# Patient Record
Sex: FEMALE | Race: WHITE
[De-identification: names, ages, dates, MRNs, and addresses within clinical notes are randomized per-mention and may not be internally consistent; named-entity substitution may affect disease eponyms.]

---

## 2017-01-06 ENCOUNTER — HOSPITAL ENCOUNTER (OUTPATIENT)
Dept: HOSPITAL 45 - X.SURG | Age: 37
Discharge: HOME | End: 2017-01-06
Attending: UROLOGY
Payer: COMMERCIAL

## 2017-01-06 VITALS — SYSTOLIC BLOOD PRESSURE: 147 MMHG | DIASTOLIC BLOOD PRESSURE: 74 MMHG | OXYGEN SATURATION: 97 % | HEART RATE: 89 BPM

## 2017-01-06 VITALS
BODY MASS INDEX: 49.01 KG/M2 | HEIGHT: 64.49 IN | BODY MASS INDEX: 49.01 KG/M2 | HEIGHT: 64.49 IN | WEIGHT: 290.61 LBS | WEIGHT: 290.61 LBS

## 2017-01-06 VITALS — TEMPERATURE: 98.78 F

## 2017-01-06 DIAGNOSIS — Z83.3: ICD-10-CM

## 2017-01-06 DIAGNOSIS — Z80.0: ICD-10-CM

## 2017-01-06 DIAGNOSIS — N20.1: Primary | ICD-10-CM

## 2017-01-06 DIAGNOSIS — E66.01: ICD-10-CM

## 2017-01-06 DIAGNOSIS — N39.0: ICD-10-CM

## 2017-01-06 DIAGNOSIS — E28.2: ICD-10-CM

## 2017-01-06 DIAGNOSIS — E03.9: ICD-10-CM

## 2017-01-06 DIAGNOSIS — E21.0: ICD-10-CM

## 2017-01-06 NOTE — DISCHARGE INSTRUCTIONS-SURGCTR
Discharge Instructions


Visit


Reason for Visit:  Stones





Discharge


Discharge Diagnosis / Problem:  same





Discharge Goals


Goal(s):  Decrease discomfort, Increase independence, Improve disease control





Medications


Stopped Medications Name(s):  


Fish Oil





Activity Recommendations


Activity Limitations:  as noted below (no driving on narcotics)





Anesthesia


.





Post Anesthesia Instructions:





If you have had General Anesthesia or IV Sedation:





*  Do not drive today.


*  Resume driving when surgeon permits.


*  Do not make important decisions or sign legal documents today.


*  Call surgeon for:





   1.  Temperature elevations greater than 101 degrees F.


   2.  Uncontrollable pain.


   3.  Excessive bleeding.


   4.  Persistent nausea and vomiting.


   5.  Medication intolerance (nausea, vomiting or rash).





*  For nausea and vomiting use only clear liquids such as: tea, soda, bouillon 

until nausea subsides, then gradually increase diet as tolerated.





*  If you have any concerns or questions, call your surgeon's office.  If 

physician is unavailable and it is an emergency, call 911 or go to the nearest 

emergency room.





.





Diet Recommendations


Home Diet:  resume previous diet





Procedures


Procedures Performed:  


Right Extracorporeal Shock Wave Lithotripsy--ureteral





Medical Emergencies








.


Who to Call and When:





Medical Emergencies:  If at any time you feel your situation is an emergency, 

please call 911 immediately.





.





Non-Emergent Contact





.


.





"Provider Documentation" section prepared by Anam Singer.

## 2017-01-06 NOTE — ANESTHESIA PROGRESS NT - [facility]
Anesthesia Post Op Note


Date & Time


Jan 6, 2017 at 14:34





Vital Signs


Pain Intensity:  0





 Vital Signs Past 12 Hours








  Date Time  Temp Pulse Resp B/P Pulse Ox O2 Delivery O2 Flow Rate FiO2


 


1/6/17 14:26 36.7 87 12 165/89 100 Diffusion Mask 6 


 


1/6/17 12:12 36.8 87 16 132/90 97 Room Air  











Notes


Mental Status:  alert / awake / arousable, participated in evaluation


Pt Amnestic to Procedure:  Yes


Nausea / Vomiting:  adequately controlled


Pain:  adequately controlled


Airway Patency, RR, SpO2:  stable & adequate


BP & HR:  stable & adequate


Hydration State:  stable & adequate


Anesthetic Complications:  no major complications apparent

## 2017-01-06 NOTE — OPERATIVE REPORT
DATE OF OPERATION:  01/06/2017

 

PROCEDURE PERFORMED:  Right ureteral ESWL.

 

SURGEON:  Dr. Singer.

 

ANESTHESIA:  General.

 

INDICATIONS:  The patient is a 36-year-old female who apparently had

urosepsis, had a stent placed, had her lithotripsy delayed last week because

of illness and presents now for ESWL of an 8 x 5 mm right distal ureteral

stone.

 

DESCRIPTION OF THE PROCEDURE:  The patient was given antibiotics

preoperatively, had Venodyne stockings placed, was taken to the operating

room and given general anesthesia.  She was given 3000 shocks, half of them

at level 6 after the stone was visualized in 2 views with her in the supine

position.  At the end of the procedure, the stone was still visualized.  It

is unclear how well it fragmented and she was transferred to the recovery

room in stable condition.

 

 

I attest to the content of the Intraoperative Record and any orders documented therein. Any exceptio
ns are noted below.

## 2017-01-06 NOTE — DIAGNOSTIC IMAGING REPORT
KUB



CLINICAL HISTORY: N20.1 Ureteral [id number] PREOPERATIVE STUDY



COMPARISON STUDY:  12/20/2016 



FINDINGS: There is a double-pigtail right-sided nephroureteral stent. There is

an 8 mm calculus projected over the distal stent consistent with a distal right

ureteral calculus. There is an 8 mm upper pole left renal calculus. There is a

second 4 mm upper pole left renal calculus. The spleen appears mildly enlarged.



IMPRESSION:  

1. Left-sided nephrolithiasis

2. 8 mm distal right ureteral calculus

3. Indwelling right-sided nephroureteral stent

4. Splenomegaly 







Electronically signed by:  Michael Gibbs M.D.

1/6/2017 10:53 AM



Dictated Date/Time:  1/6/2017 10:52 AM

## 2017-01-20 ENCOUNTER — HOSPITAL ENCOUNTER (OUTPATIENT)
Dept: HOSPITAL 45 - C.LAB1850 | Age: 37
Discharge: HOME | End: 2017-01-20
Attending: UROLOGY
Payer: COMMERCIAL

## 2017-01-20 DIAGNOSIS — N20.0: Primary | ICD-10-CM

## 2017-01-20 DIAGNOSIS — N20.1: ICD-10-CM

## 2017-01-20 LAB
ANION GAP SERPL CALC-SCNC: 10 MMOL/L (ref 3–11)
BASOPHILS # BLD: 0.03 K/UL (ref 0–0.2)
BASOPHILS NFR BLD: 0.5 %
BUN SERPL-MCNC: 16 MG/DL (ref 7–18)
BUN/CREAT SERPL: 21.6 (ref 10–20)
CALCIUM SERPL-MCNC: 8.6 MG/DL (ref 8.5–10.1)
CHLORIDE SERPL-SCNC: 102 MMOL/L (ref 98–107)
CO2 SERPL-SCNC: 27 MMOL/L (ref 21–32)
COMPLETE: YES
CREAT SERPL-MCNC: 0.72 MG/DL (ref 0.6–1.2)
EOSINOPHIL NFR BLD AUTO: 191 K/UL (ref 130–400)
GLUCOSE SERPL-MCNC: 142 MG/DL (ref 70–99)
HCT VFR BLD CALC: 35.5 % (ref 37–47)
IG%: 0.2 %
IMM GRANULOCYTES NFR BLD AUTO: 29.5 %
LYMPHOCYTES # BLD: 1.84 K/UL (ref 1.2–3.4)
MCH RBC QN AUTO: 29.2 PG (ref 25–34)
MCHC RBC AUTO-ENTMCNC: 33.8 G/DL (ref 32–36)
MCV RBC AUTO: 86.4 FL (ref 80–100)
MONOCYTES NFR BLD: 8.5 %
NEUTROPHILS # BLD AUTO: 2.2 %
NEUTROPHILS NFR BLD AUTO: 59.1 %
PMV BLD AUTO: 9.8 FL (ref 7.4–10.4)
POTASSIUM SERPL-SCNC: 3.7 MMOL/L (ref 3.5–5.1)
RBC # BLD AUTO: 4.11 M/UL (ref 4.2–5.4)
SODIUM SERPL-SCNC: 139 MMOL/L (ref 136–145)
WBC # BLD AUTO: 6.24 K/UL (ref 4.8–10.8)

## 2017-01-26 ENCOUNTER — HOSPITAL ENCOUNTER (OUTPATIENT)
Dept: HOSPITAL 45 - C.RAD1850 | Age: 37
Discharge: HOME | End: 2017-01-26
Attending: UROLOGY
Payer: COMMERCIAL

## 2017-01-26 DIAGNOSIS — N20.0: Primary | ICD-10-CM

## 2017-01-26 NOTE — DIAGNOSTIC IMAGING REPORT
KUB



CLINICAL HISTORY: Kidney stones. Ureteral stone.    



COMPARISON STUDY:  CT of the abdomen and pelvis December 12, 2016 and KUB

January 16, 2017. 



FINDINGS: Several left renal calculi measure up to 8 mm. The right ureteral

stent has been removed since exam of January 16, 2017. The distal right ureteral

calculi/fragments shown on exam of January 16, 2017 are no longer visualized.

Faint pelvic calcifications likely reflect phleboliths. 





IMPRESSION:  



1. Left renal calculi measuring up to 8 mm.



2. Interval removal of the right ureteral stent. The distal right ureteral

calculi/fragments shown on prior exam are no longer visualized.







Electronically signed by:  Js Sutton M.D.

1/26/2017 2:23 PM



Dictated Date/Time:  1/26/2017 2:20 PM

## 2017-01-27 ENCOUNTER — HOSPITAL ENCOUNTER (OUTPATIENT)
Dept: HOSPITAL 45 - X.SURG | Age: 37
Discharge: HOME | End: 2017-01-27
Attending: UROLOGY
Payer: COMMERCIAL

## 2017-01-27 VITALS
WEIGHT: 290.61 LBS | BODY MASS INDEX: 49.01 KG/M2 | BODY MASS INDEX: 49.01 KG/M2 | WEIGHT: 290.61 LBS | HEIGHT: 64.49 IN | HEIGHT: 64.49 IN

## 2017-01-27 VITALS — HEART RATE: 87 BPM | DIASTOLIC BLOOD PRESSURE: 73 MMHG | OXYGEN SATURATION: 96 % | SYSTOLIC BLOOD PRESSURE: 121 MMHG

## 2017-01-27 VITALS — TEMPERATURE: 97.52 F

## 2017-01-27 DIAGNOSIS — E28.2: ICD-10-CM

## 2017-01-27 DIAGNOSIS — E21.0: ICD-10-CM

## 2017-01-27 DIAGNOSIS — Z98.890: ICD-10-CM

## 2017-01-27 DIAGNOSIS — N39.0: ICD-10-CM

## 2017-01-27 DIAGNOSIS — Z98.51: ICD-10-CM

## 2017-01-27 DIAGNOSIS — Z95.5: ICD-10-CM

## 2017-01-27 DIAGNOSIS — N20.0: Primary | ICD-10-CM

## 2017-01-27 DIAGNOSIS — E66.01: ICD-10-CM

## 2017-01-27 RX ADMIN — SODIUM CHLORIDE, SODIUM LACTATE, POTASSIUM CHLORIDE, AND CALCIUM CHLORIDE SCH MLS/HR: 600; 310; 30; 20 INJECTION, SOLUTION INTRAVENOUS at 09:09

## 2017-01-27 RX ADMIN — SODIUM CHLORIDE, SODIUM LACTATE, POTASSIUM CHLORIDE, AND CALCIUM CHLORIDE SCH MLS/HR: 600; 310; 30; 20 INJECTION, SOLUTION INTRAVENOUS at 12:36

## 2017-01-27 NOTE — OPERATIVE REPORT
DATE OF OPERATION:  01/27/2017

 

PREOPERATIVE DIAGNOSIS:  Left renal calculus.

 

POSTOPERATIVE DIAGNOSIS:  Left renal calculus.

 

PROCEDURE PERFORMED:  Left extracorporeal shockwave lithotripsy.

 

SURGEON:  Dr. Yimi Yi. 

 

ANESTHESIA:  General.

 

ESTIMATED BLOOD LOSS:  0.

 

URINE OUTPUT:  Not recorded.

 

SPECIMENS:  None.  

 

DRAINS:  None.  

 

DESCRIPTION OF THE PROCEDURE:  Kate Cyr was identified in the

preoperative holding area.  Appropriate informed consents were reviewed and

completed and the patient was transported to the operating suite.  Upon

arrival, she received appropriate preoperative antibiotics in the form of

ciprofloxacin.  Adequate general anesthesia was achieved.  The stone in the

left upper pole was localized under fluoroscopy and treated with a total of

2500 shocks.  Further details can be found on the [employer]

Management Information Sheet.  At the conclusion of the case, the patient was

extubated and taken to the PACU in stable condition.

 

 

I attest to the content of the Intraoperative Record and any orders documented therein. Any exceptio
ns are noted below.

## 2017-01-27 NOTE — ANESTHESIA PROGRESS NT - [facility]
Anesthesia Post Op Note


Date & Time


Jan 27, 2017 at 13:12





Vital Signs


Pain Intensity:  0





 Vital Signs Past 12 Hours








  Date Time  Temp Pulse Resp B/P Pulse Ox O2 Delivery O2 Flow Rate FiO2


 


1/27/17 12:53  89 13 137/65 97   


 


1/27/17 12:53 36.4 89 13     


 


1/27/17 12:48  91 8     


 


1/27/17 12:48  91 8 153/87 91   


 


1/27/17 12:43  91 17     


 


1/27/17 12:43  90 17 153/69 100   


 


1/27/17 12:38  87 14     


 


1/27/17 12:38  87 14 149/90 100   


 


1/27/17 12:33  86 9     


 


1/27/17 12:33  86 9 129/85 100   


 


1/27/17 12:28  81 9 134/87 100   


 


1/27/17 12:28  82 9     


 


1/27/17 12:24 36.6 85 12 150/85 100 Mask 6 


 


1/27/17 12:23  84  136/81 100   


 


1/27/17 12:23  84      


 


1/27/17 08:57 37.1 99 18 137/83 97 Room Air  











Notes


Mental Status:  alert / awake / arousable, participated in evaluation


Pt Amnestic to Procedure:  Yes


Nausea / Vomiting:  adequately controlled


Pain:  adequately controlled


Airway Patency, RR, SpO2:  stable & adequate


BP & HR:  stable & adequate


Hydration State:  stable & adequate


Anesthetic Complications:  no major complications apparent

## 2017-01-27 NOTE — DISCHARGE INSTRUCTIONS-SURGCTR
Discharge Instructions


Visit


Reason for Visit:  Stones





Discharge


Discharge Diagnosis / Problem:  stones





Discharge Goals


Goal(s):  Decrease discomfort, Improve function, Increase independence, Improve 

disease control





Medications


Stopped Medications Name(s):  


fish oil and metformin and ibuprofen stopped a week ago.





Activity Recommendations


Activity Limitations:  resume your previous activity


Lifting Limitations:  none


Exercise/Sports Limitations:  none


May Resume Sexual Activity:  when tolerated


Shower/Bathe:  no limitations


Driving or Machine Use:  no limitations (as long as you are off of pain meds)





Anesthesia


.





Post Anesthesia Instructions:





If you have had General Anesthesia or IV Sedation:





*  Do not drive today.


*  Resume driving when surgeon permits.


*  Do not make important decisions or sign legal documents today.


*  Call surgeon for:





   1.  Temperature elevations greater than 101 degrees F.


   2.  Uncontrollable pain.


   3.  Excessive bleeding.


   4.  Persistent nausea and vomiting.


   5.  Medication intolerance (nausea, vomiting or rash).





*  For nausea and vomiting use only clear liquids such as: tea, soda, bouillon 

until nausea subsides, then gradually increase diet as tolerated.





*  If you have any concerns or questions, call your surgeon's office.  If 

physician is unavailable and it is an emergency, call 911 or go to the nearest 

emergency room.





.





Diet Recommendations


Home Diet:  no limitations





Pending Studies


Studies pending at discharge:  no





Medical Emergencies








.


Who to Call and When:





Medical Emergencies:  If at any time you feel your situation is an emergency, 

please call 911 immediately.





.





Non-Emergent Contact


Non-Emergency issues call your:  Urologist


Call Non-Emergent contact if:  you have a fever, temperature is above 101.5, 

your pain is not controlled, your pain is worsening





.


.





"Provider Documentation" section prepared by Daniel Hobbs.

## 2017-02-15 ENCOUNTER — HOSPITAL ENCOUNTER (OUTPATIENT)
Dept: HOSPITAL 45 - C.RAD1850 | Age: 37
Discharge: HOME | End: 2017-02-15
Attending: UROLOGY
Payer: COMMERCIAL

## 2017-02-15 DIAGNOSIS — N20.0: Primary | ICD-10-CM

## 2017-02-15 DIAGNOSIS — N20.1: ICD-10-CM

## 2017-02-15 NOTE — DIAGNOSTIC IMAGING REPORT
KUB



HISTORY:      N20.0 Kidney [id number] Ureteral [id number]



COMPARISON: KUB 1/26/2017.



FINDINGS: The bowel gas pattern is unremarkable. There are no dilated loops of

small bowel to suggest an obstruction.  Multiple left renal calculi are again

noted. Dominant 8 mm stone within the upper pole appears to be fragmented. The

remaining renal calculi remain unchanged.. No definite right renal calculi. No

ureteral calculi. No pneumoperitoneum or pneumatosis.



IMPRESSION:  

Left-sided nephrolithiasis. The dominant stone within the upper pole of the left

kidney has been fragmented in the interval.







Electronically signed by:  Nader Melendez M.D.

2/15/2017 11:54 AM



Dictated Date/Time:  2/15/2017 11:51 AM

## 2017-02-23 ENCOUNTER — HOSPITAL ENCOUNTER (OUTPATIENT)
Dept: HOSPITAL 45 - C.LAB1850 | Age: 37
Discharge: HOME | End: 2017-02-23
Attending: NURSE PRACTITIONER
Payer: COMMERCIAL

## 2017-02-23 DIAGNOSIS — N20.1: ICD-10-CM

## 2017-02-23 DIAGNOSIS — N39.0: Primary | ICD-10-CM

## 2017-03-13 ENCOUNTER — HOSPITAL ENCOUNTER (OUTPATIENT)
Dept: HOSPITAL 45 - C.RAD1850 | Age: 37
Discharge: HOME | End: 2017-03-13
Attending: NURSE PRACTITIONER
Payer: COMMERCIAL

## 2017-03-13 DIAGNOSIS — N20.0: Primary | ICD-10-CM

## 2017-03-13 NOTE — DIAGNOSTIC IMAGING REPORT
KUB



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



COMPARISON STUDY:  2/15/2017 



FINDINGS: Unchanging fragmenting calcification upper aspect left kidney. Small

calcification lower aspect left kidney. Right kidney shows no definite

calcifications. No definite paravertebral calcifications. Pelvic structures are

unremarkable.



IMPRESSION:  Slight decrease in number of several fragmented calcifications

lower aspect left kidney. Unchanging fragmented calcification upper aspect left

kidney. 







Electronically signed by:  Brandyn Ariza M.D.

3/13/2017 3:38 PM



Dictated Date/Time:  3/13/2017 3:37 PM

## 2017-03-24 ENCOUNTER — HOSPITAL ENCOUNTER (OUTPATIENT)
Dept: HOSPITAL 45 - C.LAB1850 | Age: 37
Discharge: HOME | End: 2017-03-24
Attending: NURSE PRACTITIONER
Payer: COMMERCIAL

## 2017-03-24 DIAGNOSIS — R30.0: ICD-10-CM

## 2017-03-24 DIAGNOSIS — N39.0: Primary | ICD-10-CM

## 2017-04-27 ENCOUNTER — HOSPITAL ENCOUNTER (OUTPATIENT)
Dept: HOSPITAL 45 - C.RAD1850 | Age: 37
Discharge: HOME | End: 2017-04-27
Attending: NURSE PRACTITIONER
Payer: COMMERCIAL

## 2017-04-27 DIAGNOSIS — N39.0: ICD-10-CM

## 2017-04-27 DIAGNOSIS — N20.0: Primary | ICD-10-CM

## 2017-04-27 NOTE — DIAGNOSTIC IMAGING REPORT
KUB



CLINICAL HISTORY: Kidney stones. Recurrent urinary tract infection    



COMPARISON STUDY:  KUB March 13, 2017. 



FINDINGS: The bowel gas pattern is normal. Several left renal calculi are

similar to exam of March 13, 2017. No ureteral calculi are identified.



IMPRESSION:  No significant change in left-sided nephrolithiasis. 







Electronically signed by:  Js Sutton M.D.

4/27/2017 1:20 PM



Dictated Date/Time:  4/27/2017 1:18 PM

## 2017-05-11 ENCOUNTER — HOSPITAL ENCOUNTER (OUTPATIENT)
Dept: HOSPITAL 45 - C.LAB1850 | Age: 37
Discharge: HOME | End: 2017-05-11
Attending: NURSE PRACTITIONER
Payer: COMMERCIAL

## 2017-05-11 DIAGNOSIS — N20.1: Primary | ICD-10-CM

## 2017-05-11 LAB
ANION GAP SERPL CALC-SCNC: 6 MMOL/L (ref 3–11)
BASOPHILS # BLD: 0.03 K/UL (ref 0–0.2)
BASOPHILS NFR BLD: 0.4 %
BUN SERPL-MCNC: 13 MG/DL (ref 7–18)
BUN/CREAT SERPL: 16.4 (ref 10–20)
CHLORIDE SERPL-SCNC: 101 MMOL/L (ref 98–107)
CO2 SERPL-SCNC: 30 MMOL/L (ref 21–32)
COMPLETE: YES
CREAT SERPL-MCNC: 0.8 MG/DL (ref 0.6–1.2)
EOSINOPHIL NFR BLD AUTO: 218 K/UL (ref 130–400)
HCT VFR BLD CALC: 39.2 % (ref 37–47)
IG%: 0.1 %
IMM GRANULOCYTES NFR BLD AUTO: 27.5 %
LYMPHOCYTES # BLD: 1.95 K/UL (ref 1.2–3.4)
MCH RBC QN AUTO: 28.1 PG (ref 25–34)
MCHC RBC AUTO-ENTMCNC: 32.7 G/DL (ref 32–36)
MCV RBC AUTO: 86 FL (ref 80–100)
MONOCYTES NFR BLD: 6.9 %
NEUTROPHILS # BLD AUTO: 2.8 %
NEUTROPHILS NFR BLD AUTO: 62.3 %
PMV BLD AUTO: 10.7 FL (ref 7.4–10.4)
POTASSIUM SERPL-SCNC: 4 MMOL/L (ref 3.5–5.1)
RBC # BLD AUTO: 4.56 M/UL (ref 4.2–5.4)
SODIUM SERPL-SCNC: 137 MMOL/L (ref 136–145)
WBC # BLD AUTO: 7.1 K/UL (ref 4.8–10.8)

## 2017-05-25 ENCOUNTER — HOSPITAL ENCOUNTER (OUTPATIENT)
Dept: HOSPITAL 45 - C.RAD1850 | Age: 37
Discharge: HOME | End: 2017-05-25
Attending: NURSE PRACTITIONER
Payer: COMMERCIAL

## 2017-05-25 DIAGNOSIS — N20.0: Primary | ICD-10-CM

## 2017-05-25 NOTE — DIAGNOSTIC IMAGING REPORT
KUB



CLINICAL HISTORY: Ureteral stone.



FINDINGS: 2 AP supine abdominal radiographs are compared to study dated

4/27/2017 and correlated with abdominal CT dated 12/12/2016. There is a

nonobstructed abdominal bowel gas pattern. An 8 mm calculus projects over the

upper pole of the left kidney. No additional calcifications are seen projecting

over the right kidney or along the course of the ureters. The bony structures

appear intact. The lung bases are clear as imaged.



IMPRESSION:



1. A nonobstructing left renal calculus is similar to previous.



2. There is no radiographic evidence of ureteral calculus as clinically queried.







Electronically signed by:  Solomon Cho M.D.

5/25/2017 4:13 PM



Dictated Date/Time:  5/25/2017 4:06 PM

## 2017-05-26 ENCOUNTER — HOSPITAL ENCOUNTER (OUTPATIENT)
Dept: HOSPITAL 45 - X.SURG | Age: 37
Discharge: HOME | End: 2017-05-26
Attending: UROLOGY
Payer: COMMERCIAL

## 2017-05-26 VITALS — SYSTOLIC BLOOD PRESSURE: 134 MMHG | OXYGEN SATURATION: 98 % | HEART RATE: 83 BPM | DIASTOLIC BLOOD PRESSURE: 76 MMHG

## 2017-05-26 VITALS
HEIGHT: 65 IN | WEIGHT: 290.61 LBS | WEIGHT: 290.61 LBS | BODY MASS INDEX: 48.42 KG/M2 | BODY MASS INDEX: 48.42 KG/M2 | HEIGHT: 65 IN

## 2017-05-26 VITALS — TEMPERATURE: 98.24 F

## 2017-05-26 DIAGNOSIS — Z80.0: ICD-10-CM

## 2017-05-26 DIAGNOSIS — E89.2: ICD-10-CM

## 2017-05-26 DIAGNOSIS — Z87.442: ICD-10-CM

## 2017-05-26 DIAGNOSIS — F32.9: ICD-10-CM

## 2017-05-26 DIAGNOSIS — E55.9: ICD-10-CM

## 2017-05-26 DIAGNOSIS — E66.01: ICD-10-CM

## 2017-05-26 DIAGNOSIS — Z98.51: ICD-10-CM

## 2017-05-26 DIAGNOSIS — Z87.440: ICD-10-CM

## 2017-05-26 DIAGNOSIS — N20.0: Primary | ICD-10-CM

## 2017-05-26 DIAGNOSIS — Z79.84: ICD-10-CM

## 2017-05-26 DIAGNOSIS — Z83.3: ICD-10-CM

## 2017-05-26 DIAGNOSIS — Z80.3: ICD-10-CM

## 2017-05-26 DIAGNOSIS — E21.0: ICD-10-CM

## 2017-05-26 NOTE — ANESTHESIA PROGRESS NT - [facility]
Anesthesia Post Op Note


Date & Time


May 26, 2017 at 09:19





Vital Signs


Pain Intensity:  0





 Vital Signs Past 12 Hours








  Date Time  Temp Pulse Resp B/P Pulse Ox O2 Delivery O2 Flow Rate FiO2


 


5/26/17 08:47  83 20 134/76 98 Room Air  


 


5/26/17 08:20 36.8 83 18 127/80 96 Room Air  


 


5/26/17 08:16  82 15 131/74 97   


 


5/26/17 08:16  83 15     


 


5/26/17 08:15  90 18  97   


 


5/26/17 08:15 36.8 89 18     


 


5/26/17 08:11    157/80    


 


5/26/17 08:10  91 13  98   


 


5/26/17 08:10  93 13     


 


5/26/17 08:06    127/92    


 


5/26/17 08:05  79 14  99   


 


5/26/17 08:05  80 14     


 


5/26/17 08:01    125/79    


 


5/26/17 08:00  85 14  100   


 


5/26/17 08:00  84 14     


 


5/26/17 07:56    138/73    


 


5/26/17 07:55  85 17  100   


 


5/26/17 07:55  86 17     


 


5/26/17 07:51    140/83    


 


5/26/17 07:50  78 16  100   


 


5/26/17 07:50  79 16     


 


5/26/17 07:48 36.6 82 16 132/74 99 Mask 6 


 


5/26/17 07:46    132/74    


 


5/26/17 06:26 37.1 84 18 137/82 98 Room Air  











Notes


Mental Status:  alert / awake / arousable, participated in evaluation


Pt Amnestic to Procedure:  Yes


Nausea / Vomiting:  adequately controlled


Pain:  adequately controlled


Airway Patency, RR, SpO2:  stable & adequate


BP & HR:  stable & adequate


Hydration State:  stable & adequate


Anesthetic Complications:  no major complications apparent

## 2017-05-26 NOTE — DISCHARGE INSTRUCTIONS
Discharge Instructions


Date of Service


May 26, 2017.





Admission


Reason for Admission:  Stones





Discharge


Discharge Diagnosis / Problem:  L renal stones s/p ESWL





Discharge Goals


Goal(s):  Decrease discomfort, Improve function, Improve disease control, 

Therapeutic intervention





Activity Recommendations


Activity Limitations:  per Instructions/Follow-up section


Lifting Limitations:  no more than 25 pounds, gradually increase as tolerated


Exercise/Sports Limitations:  rest today


May Resume Sexual Activity:  when tolerated


Shower/Bathe:  no limitations


Driving or Machine Use:  resume 1 day after discharge


Strain urine as instructed





KUB prior to follow-up appointment as instructed


.





Discharge Diet


Recommended Diet:  Regular Diet (good fluid intake)





Procedures


Procedures Performed:  


L renal ESWL





Pending Studies


Studies pending at discharge:  no





Medical Emergencies








.


Who to Call and When:





Medical Emergencies:  If at any time you feel your situation is an emergency, 

please call 911 immediately.





.





Non-Emergent Contact


Non-Emergency issues call your:  Urologist


Call Non-Emergent contact if:  you have a fever, temperature is above 101, your 

pain is not controlled, your pain is worsening, your pain is unusual for you, 

your pain is concerning you, you have any medication questions





.


.





"Provider Documentation" section prepared by Jalen Burgess.








.





VTE Core Measure


Inpt VTE Proph given/why not?:  SCD's





[place] Drug Monitoring Program


Search Results:  patient reviewed within database, no issues identified

## 2017-05-26 NOTE — OPERATIVE REPORT
DATE OF OPERATION:  05/26/2017

 

PREOPERATIVE DIAGNOSIS:  Left 8-mm renal stone.

 

POSTOPERATIVE DIAGNOSIS:  Same.

 

PROCEDURE:  Left-sided renal extracorporeal shockwave lithotripsy.

 

SURGEON:  Dr. Jalen Burgess.

 

ASSISTANT:  None.

 

ANESTHESIA:  General anesthesia with laryngeal mask.

 

COMPLICATIONS:  None.

 

FINDINGS:  Good stone fragmentation on fluoroscopy.

 

DRAINS: None.

 

SPECIMENS:  None.

 

BRIEF HISTORY:  Ms. Cyr is a pleasant 36-year-old  female with a

history of stone disease, who has undergone lithotripsy and passage of stone

fragments.  She has residual left 8-mm lower pole stone fragment and is here

today for therapy.  Risks and benefits were reviewed and informed consent

reviewed with the patient.  The patient reports that she has pain medication

at home.  Therefore, no additional pain medication will be provided today.

 

DETAILS OF PROCEDURE:  The patient was brought to the litho suite. She was

correctly identified and the stone was visualized on her most recent x-rays. 

After the correct time out was performed the patient was positioned over the

therapy head.  An adequate level of anesthesia was administered.  The

extracorporeal shockwave lithotripsy treatment was then commenced.  Please

see the [employer] Management sheet for complete treatment

summary.  After completion of the procedure the patient was taken to the

recovery room in stable condition.

 

 

I attest to the content of the Intraoperative Record and any orders documented therein. Any exceptio
ns are noted below.

## 2017-05-26 NOTE — [facility] POST OPERATIVE BRIEF NOTE
Immediate Operative Summary


Operative Date


May 26, 2017.





Pre-Operative Diagnosis





Left Renal Calculi





Post-Operative Diagnosis





Same





Procedure(s) Performed





L renal ESWL





Surgeon


Dr. EVELIO Burgess





Assistant Surgeon(s)


None





Estimated Blood Loss


0 mL





Findings


Good stone fragmentation





Specimens





None





Drains


NA





Anesthesia


GALMA





Complication(s)


None





Disposition


Recovery Room / PACU

## 2017-06-06 ENCOUNTER — HOSPITAL ENCOUNTER (OUTPATIENT)
Dept: HOSPITAL 45 - C.RAD | Age: 37
Discharge: HOME | End: 2017-06-06
Attending: NURSE PRACTITIONER
Payer: COMMERCIAL

## 2017-06-06 DIAGNOSIS — N20.1: Primary | ICD-10-CM

## 2017-07-03 ENCOUNTER — HOSPITAL ENCOUNTER (OUTPATIENT)
Dept: HOSPITAL 45 - C.RAD1850 | Age: 37
Discharge: HOME | End: 2017-07-03
Attending: NURSE PRACTITIONER
Payer: COMMERCIAL

## 2017-07-03 DIAGNOSIS — N20.0: Primary | ICD-10-CM

## 2017-07-03 NOTE — DIAGNOSTIC IMAGING REPORT
KUB



CLINICAL HISTORY: N20.0 Kidney [id number]    



COMPARISON STUDY:  6/6/2017 



FINDINGS: No change in the prior study. Nonobstructing lower pole and possibly

upper pole left renal calcifications. Right renal shadow appears to be

unremarkable. Bowel pattern is nonobstructive. There are no significant pelvic

calcifications.



IMPRESSION:  Nephrocalcinosis unchanged from the prior study. 







Electronically signed by:  Brandyn Ariza M.D.

7/3/2017 8:50 AM



Dictated Date/Time:  7/3/2017 8:49 AM

## 2017-07-18 ENCOUNTER — HOSPITAL ENCOUNTER (OUTPATIENT)
Dept: HOSPITAL 45 - C.LAB1850 | Age: 37
Discharge: HOME | End: 2017-07-18
Attending: UROLOGY
Payer: COMMERCIAL

## 2017-07-18 DIAGNOSIS — N20.2: Primary | ICD-10-CM

## 2017-07-18 LAB
ANION GAP SERPL CALC-SCNC: 6 MMOL/L (ref 3–11)
BASOPHILS # BLD: 0.02 K/UL (ref 0–0.2)
BASOPHILS NFR BLD: 0.3 %
BUN SERPL-MCNC: 14 MG/DL (ref 7–18)
BUN/CREAT SERPL: 18.9 (ref 10–20)
CALCIUM SERPL-MCNC: 8.8 MG/DL (ref 8.5–10.1)
CHLORIDE SERPL-SCNC: 102 MMOL/L (ref 98–107)
CO2 SERPL-SCNC: 28 MMOL/L (ref 21–32)
COMPLETE: YES
CREAT SERPL-MCNC: 0.76 MG/DL (ref 0.6–1.2)
EOSINOPHIL NFR BLD AUTO: 201 K/UL (ref 130–400)
GLUCOSE SERPL-MCNC: 218 MG/DL (ref 70–99)
HCT VFR BLD CALC: 37.9 % (ref 37–47)
IG%: 0.3 %
IMM GRANULOCYTES NFR BLD AUTO: 28.6 %
LYMPHOCYTES # BLD: 1.87 K/UL (ref 1.2–3.4)
MCH RBC QN AUTO: 27.8 PG (ref 25–34)
MCHC RBC AUTO-ENTMCNC: 32.7 G/DL (ref 32–36)
MCV RBC AUTO: 85 FL (ref 80–100)
MONOCYTES NFR BLD: 8.3 %
NEUTROPHILS # BLD AUTO: 3.4 %
NEUTROPHILS NFR BLD AUTO: 59.1 %
PMV BLD AUTO: 10.4 FL (ref 7.4–10.4)
POTASSIUM SERPL-SCNC: 4 MMOL/L (ref 3.5–5.1)
RBC # BLD AUTO: 4.46 M/UL (ref 4.2–5.4)
SODIUM SERPL-SCNC: 136 MMOL/L (ref 136–145)
WBC # BLD AUTO: 6.54 K/UL (ref 4.8–10.8)

## 2017-07-31 ENCOUNTER — HOSPITAL ENCOUNTER (OUTPATIENT)
Dept: HOSPITAL 45 - C.RAD1850 | Age: 37
Discharge: HOME | End: 2017-07-31
Attending: NURSE PRACTITIONER
Payer: COMMERCIAL

## 2017-07-31 DIAGNOSIS — N20.0: Primary | ICD-10-CM

## 2017-07-31 DIAGNOSIS — N39.0: ICD-10-CM

## 2017-07-31 NOTE — DIAGNOSTIC IMAGING REPORT
KUB



CLINICAL HISTORY: 36 years-old Female presenting with kidney stones, recurrent

UTI. 



TECHNIQUE: Single supine view of the abdomen was obtained.



COMPARISON:  7/3/2017.



FINDINGS:

Calcifications projecting over the left kidney, the largest measuring 6 mm at

the upper pole. No calcifications project over the right kidney or along the

course of the ureters. No calcification projects over the bladder.



Nonobstructive bowel gas pattern. Osseous structures normal.



IMPRESSION:

1.  Stable left renal calculi. No evidence of right renal calculi.







Electronically signed by:  Luis M Talamantes M.D.

7/31/2017 1:06 PM



Dictated Date/Time:  7/31/2017 1:03 PM

## 2017-08-01 ENCOUNTER — HOSPITAL ENCOUNTER (OUTPATIENT)
Dept: HOSPITAL 45 - C.ACU | Age: 37
Discharge: HOME | End: 2017-08-01
Attending: UROLOGY
Payer: COMMERCIAL

## 2017-08-01 VITALS
OXYGEN SATURATION: 95 % | TEMPERATURE: 98.42 F | DIASTOLIC BLOOD PRESSURE: 90 MMHG | SYSTOLIC BLOOD PRESSURE: 152 MMHG | HEART RATE: 94 BPM

## 2017-08-01 VITALS
SYSTOLIC BLOOD PRESSURE: 152 MMHG | TEMPERATURE: 98.78 F | DIASTOLIC BLOOD PRESSURE: 78 MMHG | OXYGEN SATURATION: 98 % | HEART RATE: 83 BPM

## 2017-08-01 VITALS
HEIGHT: 65 IN | WEIGHT: 290.61 LBS | BODY MASS INDEX: 48.42 KG/M2 | BODY MASS INDEX: 48.42 KG/M2 | WEIGHT: 290.61 LBS | HEIGHT: 65 IN

## 2017-08-01 VITALS
DIASTOLIC BLOOD PRESSURE: 79 MMHG | TEMPERATURE: 98.78 F | OXYGEN SATURATION: 94 % | SYSTOLIC BLOOD PRESSURE: 155 MMHG | HEART RATE: 90 BPM

## 2017-08-01 DIAGNOSIS — Z79.899: ICD-10-CM

## 2017-08-01 DIAGNOSIS — E21.0: ICD-10-CM

## 2017-08-01 DIAGNOSIS — E28.2: ICD-10-CM

## 2017-08-01 DIAGNOSIS — E03.9: ICD-10-CM

## 2017-08-01 DIAGNOSIS — E66.01: ICD-10-CM

## 2017-08-01 DIAGNOSIS — Z79.84: ICD-10-CM

## 2017-08-01 DIAGNOSIS — N20.0: Primary | ICD-10-CM

## 2017-08-01 NOTE — DIAGNOSTIC IMAGING REPORT
RETROGRADE UROGRAM 3 VIEWS



CLINICAL HISTORY: LASER, LITHOTRIPSY LEFT    



COMPARISON STUDY:  KUB dated 7/31/2017



FINDINGS: 20 seconds of fluoroscopic time was utilized. 3 intraoperative

fluoroscopic spot images are provided for interpretation. The images demonstrate

a retrograde catheter and guidewire within the left renal pelvis. There is an

equivocal filling defect within a lower pole calyx possibly representing an air

bubble. The final image demonstrates the proximal pigtail of a nephroureteral

stent



IMPRESSION:  Intraoperative fluoroscopic spot images during the placement of a

left-sided nephroureteral stent 









Electronically signed by:  Michael Gibbs M.D.

8/1/2017 1:25 PM



Dictated Date/Time:  8/1/2017 1:23 PM

## 2017-08-01 NOTE — ANESTHESIOLOGY PROGRESS NOTE
Anesthesia Post Op Note


Date & Time


Aug 1, 2017 at 13:13





Vital Signs


Pain Intensity:  4





Vital Signs Past 12 Hours








  Date Time  Temp Pulse Resp B/P (MAP) Pulse Ox O2 Delivery O2 Flow Rate FiO2


 


8/1/17 13:05  85 16 157/95 97 Oxymask 3 


 


8/1/17 12:55  92 16 158/97 97 Oxymask 3 


 


8/1/17 12:45  90 16 144/94 99 Oxymask 5 


 


8/1/17 12:38 36.6 85 12 147/85 100 Oxymask 10 


 


8/1/17 11:10 37.1 83 20 152/78 (102) 98 Room Air  











Notes


Mental Status:  alert / awake / arousable, participated in evaluation


Pt Amnestic to Procedure:  Yes


Nausea / Vomiting:  adequately controlled


Pain:  adequately controlled


Airway Patency, RR, SpO2:  stable & adequate


BP & HR:  stable & adequate


Hydration State:  stable & adequate


Anesthetic Complications:  no major complications apparent

## 2017-08-01 NOTE — [facility] OPERATIVE REPORT
Operative Report


Operative Date


Aug 1, 2017.





Pre-Operative Diagnosis





Nephrolithiasis





Post-Operative Diagnosis





Nephrolithiasis





Procedure(s) Performed





Cystoscopy, Left Ureteroscopy, Laser Lithotripsy; Stent ([id number])





Surgeon


Kassidy





Assistant Surgeon(s)


none





Estimated Blood Loss


3 mL





Findings


4-5 stones within the left kidney; no hydronephrosis





Drains


6 Chadian by 26 cm double-J ureteral stent





Anesthesia


Gen.





Complication(s)


None





Disposition


Recovery Room / PACU (stable)





Indications


Recurrent stone formation





Description of Procedure


Kate Cyr was identified in the preoperative holding area, appropriate 

informed consents reviewed and completed, and the patient was transported to 

the operating suite.  Upon arrival she received appropriate preoperative 

antibiotics in the form of ceftriaxone.  Adequate general anesthesia was 

achieved, and the patient was placed in dorsal lithotomy position where she was 

sterilely prepped and draped in standard fashion.  I began the case by passing 

a 22 Chadian cystoscope with 30 lens per urethra.  Full inspection of the 

bladder was carried out.  Ureteral orifices were in orthotopic position.  There 

were no  mucosal tumors nor bladder calculi.





Following my inspection I turned my attention to the left ureteral orifice.  

This orifice was cannulated with a sensor wire and a 10 Chadian double-lumen 

catheter.  I utilized the second port on the double-lumen catheter to pass a 

second wire to the kidney.  Under fluoroscopic guidance there were faint 

opacities within the presumed location of the kidney consistent with the stones 

seen previously on imaging.  I withdrew the 10 Chadian double-lumen catheter and 

under fluoroscopic guidance I advanced a ureteral access sheath up the ureter.  

I then passed a flexible ureteroscope and performed a full renoscopy.  In the 

upper pole I encountered 2 distinct stones, and additional stone was located in 

the midpole, and a series of stones in the lower pole.  Additionally there 

appeared to be very small Randles plaques scattered around the papillae.  I 

passed a 400  laser fiber and commenced laser lithotripsy beginning in the 

upper pole.  I sequentially moved through all of the stones, fragmenting each 

into pieces small enough for safe, spontaneous passage.  After treating all of 

the stones, I repeated a full renoscopy confirming no large retained fragments.

  I then performed a very careful exit ureteroscopy simultaneously withdrawing 

the ureteral access sheath.  There is no evidence of ureteral trauma nor stones 

within the ureter.  I performed a retrograde pyelogram revealing no 

extravasation.  I then placed a 6 Chadian by 26 cm double-J stent over the 

existing safety wire.  An appropriate curl was seen in the bladder as well as 

the kidney.  The bladder was decompressed and the case concluded.  Patient was 

extubated and taken to the PACU in stable condition.


I attest to the content of the Intraoperative Record and any orders documented 

therein.  Any exceptions are noted below.

## 2017-08-01 NOTE — DISCHARGE INSTRUCTIONS
Discharge Instructions


Date of Service


Aug 1, 2017.





Admission


Reason for Admission:  Stones





Discharge


Discharge Diagnosis / Problem:  kidney stones





Discharge Goals


Goal(s):  Decrease discomfort, Improve function, Increase independence, Improve 

disease control, Prevent Disease Progression





Activity Recommendations


Activity Limitations:  resume your previous activity


Lifting Limitations:  none, gradually increase as tolerated


Exercise/Sports Limitations:  gradually increase as tolerated


May Resume Sexual Activity:  when tolerated


Shower/Bathe:  no limitations


Driving or Machine Use:  resume 1 day after discharge





.





Instructions / Follow-Up


Instructions / Follow-Up


Dr. Yi's office will call you to arrange further follow-up and stent 

removal





Discharge Diet


Recommended Diet:  Regular Diet





Pending Studies


Studies pending at discharge:  no





Medical Emergencies








.


Who to Call and When:





Medical Emergencies:  If at any time you feel your situation is an emergency, 

please call 911 immediately.





.





Non-Emergent Contact


Non-Emergency issues call your:  Urologist


Call Non-Emergent contact if:  you have a fever, temperature is above 101.5, 

your pain is not controlled, your pain is worsening





.


.








"Provider Documentation" section prepared by Daniel Hobbs.








.





VTE Core Measure


Inpt VTE Proph given/why not?:  Treatment not indicated





[place] Drug Monitoring Program


Search Results:  patient reviewed within database, no issues identified

## 2017-08-07 ENCOUNTER — HOSPITAL ENCOUNTER (OUTPATIENT)
Dept: HOSPITAL 45 - C.RAD1850 | Age: 37
Discharge: HOME | End: 2017-08-07
Attending: UROLOGY
Payer: COMMERCIAL

## 2017-08-07 DIAGNOSIS — N20.2: Primary | ICD-10-CM

## 2017-08-07 NOTE — DIAGNOSTIC IMAGING REPORT
K[facility]



CLINICAL HISTORY: Kidney stones. Ureteral stone.    



COMPARISON STUDY:  [facility]UB July 31, 2017 and retrograde exam August 1, 2017.



FINDINGS: A left ureteral stent is appropriately positioned. Note is made of a 3

mm calculus within the left pole of the left kidney. There may also be calculi

fragments within the upper pole of the left kidney. An equivocal right renal

calculus is probably artifactual. No ureteral calculi are identified.



IMPRESSION:  



1. Appropriately positioned left ureteral stent. No ureteral calculi identified.



2. Left-sided nephrolithiasis with possible fragmented upper pole calculus.



3. Equivocal right renal calculus which is probably artifactual. 







Electronically signed by:  Js Sutton M.D.

8/7/2017 12:50 PM



Dictated Date/Time:  8/7/2017 12:46 PM

## 2018-05-23 ENCOUNTER — HOSPITAL ENCOUNTER (OUTPATIENT)
Dept: HOSPITAL 45 - C.RAD1850 | Age: 38
Discharge: HOME | End: 2018-05-23
Attending: UROLOGY
Payer: COMMERCIAL

## 2018-05-23 DIAGNOSIS — N39.0: Primary | ICD-10-CM

## 2018-05-23 NOTE — DIAGNOSTIC IMAGING REPORT
KUB



HISTORY: Acute urinary tract infection  N39.0 RECURRENT UTI



COMPARISON: K[facility] 8/7/2017



FINDINGS: The bowel gas pattern is non-obstructive. There is no organomegaly. 

Status post removal of the left ureteral stent. Renal shadows are partially

obscured by bowel gas. Questioned punctate calculus of the inferior pole right

kidney. No definite left nephrolithiasis or ureteral calculi. No

pneumoperitoneum or pneumatosis. No fracture.



IMPRESSION:  



Probable punctate calculus of the inferior pole right kidney. No ureteral

calculi identified.







Electronically signed by:  Rio Craig M.D.

5/23/2018 2:57 PM



Dictated Date/Time:  5/23/2018 2:55 PM

## 2022-05-08 NOTE — [facility] POST OPERATIVE BRIEF NOTE
Immediate Operative Summary


Operative Date


Jan 27, 2017.





Pre-Operative Diagnosis





Left Renal Calculi





Post-Operative Diagnosis





Same





Procedure(s) Performed





Left Extracorporeal Shock Wave Lithotripsy





Surgeon


Dr. KENDRICK Yi





Assistant Surgeon(s)


None





Estimated Blood Loss


0 mL





Findings


L renal stone - appeared to fragment





Specimens





None





Drains


none





Anesthesia


gen





Complication(s)


None





Disposition


Recovery Room / PACU (stable)
Harvinder Knight
Current every day smoker